# Patient Record
Sex: MALE | Race: WHITE | Employment: UNEMPLOYED | URBAN - METROPOLITAN AREA
[De-identification: names, ages, dates, MRNs, and addresses within clinical notes are randomized per-mention and may not be internally consistent; named-entity substitution may affect disease eponyms.]

---

## 2021-04-05 ENCOUNTER — OFFICE VISIT (OUTPATIENT)
Dept: ORTHOPEDICS | Facility: CLINIC | Age: 46
End: 2021-04-05
Payer: COMMERCIAL

## 2021-04-05 DIAGNOSIS — M54.16 LUMBAR BACK PAIN WITH RADICULOPATHY AFFECTING RIGHT LOWER EXTREMITY: ICD-10-CM

## 2021-04-05 DIAGNOSIS — M54.16 LUMBAR RADICULOPATHY: Primary | ICD-10-CM

## 2021-04-05 PROCEDURE — 99204 OFFICE O/P NEW MOD 45 MIN: CPT | Performed by: FAMILY MEDICINE

## 2021-04-05 NOTE — PROGRESS NOTES
[facility]  4/5/2021    Jose Andrews's chief complaint for this visit includes:  Chief Complaint   Patient presents with     Consult     low back pain, right leg numbness, MRI - CDI      PCP: Con Alfaro      Reason for visit:     What part of your body is injured / painful?  bilateral low back    What caused the injury /pain? No inciting event     How long ago did your injury occur or pain begin? problem is longstanding    What are your most bothersome symptoms? Pain    How would you characterize your symptom?  aching    What makes your symptoms better? Rest    What makes your symptoms worse? Movement    Have you been previously seen for this problem? No    Medical History:    Any recent changes to your medical history? No    Any new medication prescribed since last visit? No    Have you had surgery on this body part before? No    Social History:    Occupation:      Review of Systems:    Do you have fever, chills, weight loss? No    Do you have any vision problems? No    Do you have any chest pain or edema? No    Do you have any shortness of breath or wheezing?  No    Do you have stomach problems? No    Do you have any urinary track issues? No    Do you have any numbness or focal weakness? No    Do you have diabetes? No    Do you have problems with bleeding or clotting? No    Do you have an rashes or other skin lesions? No       CHIEF COMPLAINT:  Consult (low back pain, right leg numbness, MRI - CDI )       HISTORY OF PRESENT ILLNESS  Mr. Andrews is a pleasant 45 year old male who presents to clinic today with back and right leg pain.  Anastacio has had severe low back pain for the past 5 months or so.  No single inciting event that he can recall.  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  He was originally treated at his primary care office and chiropractor's  office, he had attended physical therapy appointments a few times a week for about 5 weeks.  Associated with these were structured, formal physical therapy sessions.  Unfortunately the last treatment he had may have made his back pain somewhat worse.  He points to his low back, bilaterally.  At times he does get numbness and tingling down his right leg, this is a newer issue.  He is in quite severe pain today.        Additional history: as documented    MEDICAL HISTORY  There is no problem list on file for this patient.      No current outpatient medications on file.       No Known Allergies    No family history on file.    Additional medical/Social/Surgical histories reviewed in EPIC and updated as appropriate.        PHYSICAL EXAM  General  - normal appearance, in no obvious distress  HEENT  - conjunctivae not injected, moist mucous membranes  CV  - normal peripheral perfusion  Pulm  - normal respiratory pattern, non-labored  Musculoskeletal - lumbar spine  - stance: slow to rise and sit  - inspection: normal bone and joint alignment, no obvious scoliosis  - palpation: no paravertebral or bony tenderness  - ROM: flexion exacerbates pain, normal extension, sidebending, rotation  - strength: lower extremities 5/5 in all planes  - special tests:  (-) slump test on right  Neuro  - patellar and Achilles DTRs 2+ bilaterally, grossly normal coordination, normal muscle tone  Skin  - no ecchymosis, erythema, warmth, or induration, no obvious rash  Psych  - interactive, appropriate, normal mood and affect                 ASSESSMENT & PLAN  Mr. Andrews is a 45 year old male who presents to clinic today with back and right leg pain.    I reviewed his MR in the room with him, he does have severe disc degeneration at L4-5 with impingement of the L5 nerve roots.    Jose and I had a good discussion regarding non-operative treatment for his back.  This included strengthening of the paraspinal and core muscles, weight control,  and oral analgesics when needed.  We also discussed the role of epidural corticosteroid injections.    I'm referring him for a lumbar injection at L4-5, he can have this done at his earliest convenience.  I'm also referring him to our surgical partners.    It was a pleasure seeing Anastacio today.    Ezra Oneill DO, [facility]  Primary Care Sports Medicine      This note was constructed using Dragon dictation software, please excuse any minor errors in spelling, grammar, or syntax.

## 2021-04-05 NOTE — LETTER
4/5/2021         RE: Jose Andrews  [address]  [place] [address]        Dear Colleague,    Thank you for referring your patient, Jose Andrews, to the [facility] [place]. Please see a copy of my visit note below.          [facility]  4/5/2021    Jose Andrews's chief complaint for this visit includes:  Chief Complaint   Patient presents with     Consult     low back pain, right leg numbness, MRI - CDI      PCP: Con Alfaro      Reason for visit:     What part of your body is injured / painful?  bilateral low back    What caused the injury /pain? No inciting event     How long ago did your injury occur or pain begin? problem is longstanding    What are your most bothersome symptoms? Pain    How would you characterize your symptom?  aching    What makes your symptoms better? Rest    What makes your symptoms worse? Movement    Have you been previously seen for this problem? No    Medical History:    Any recent changes to your medical history? No    Any new medication prescribed since last visit? No    Have you had surgery on this body part before? No    Social History:    Occupation:      Review of Systems:    Do you have fever, chills, weight loss? No    Do you have any vision problems? No    Do you have any chest pain or edema? No    Do you have any shortness of breath or wheezing?  No    Do you have stomach problems? No    Do you have any urinary track issues? No    Do you have any numbness or focal weakness? No    Do you have diabetes? No    Do you have problems with bleeding or clotting? No    Do you have an rashes or other skin lesions? No       CHIEF COMPLAINT:  Consult (low back pain, right leg numbness, MRI - CDI )       HISTORY OF PRESENT ILLNESS  Mr. Andrews is a pleasant 45 year old male who presents to clinic today with back and right leg pain.  Anastacio has had severe low back pain for the past 5 months or so.  No  single inciting event that he can recall.  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  He was originally treated at his primary care office and chiropractor's office, he had attended physical therapy appointments a few times a week for about 5 weeks.  Associated with these were structured, formal physical therapy sessions.  Unfortunately the last treatment he had may have made his back pain somewhat worse.  He points to his low back, bilaterally.  At times he does get numbness and tingling down his right leg, this is a newer issue.  He is in quite severe pain today.        Additional history: as documented    MEDICAL HISTORY  There is no problem list on file for this patient.      No current outpatient medications on file.       No Known Allergies    No family history on file.    Additional medical/Social/Surgical histories reviewed in EPIC and updated as appropriate.        PHYSICAL EXAM  General  - normal appearance, in no obvious distress  HEENT  - conjunctivae not injected, moist mucous membranes  CV  - normal peripheral perfusion  Pulm  - normal respiratory pattern, non-labored  Musculoskeletal - lumbar spine  - stance: slow to rise and sit  - inspection: normal bone and joint alignment, no obvious scoliosis  - palpation: no paravertebral or bony tenderness  - ROM: flexion exacerbates pain, normal extension, sidebending, rotation  - strength: lower extremities 5/5 in all planes  - special tests:  (-) slump test on right  Neuro  - patellar and Achilles DTRs 2+ bilaterally, grossly normal coordination, normal muscle tone  Skin  - no ecchymosis, erythema, warmth, or induration, no obvious rash  Psych  - interactive, appropriate, normal mood and affect                 ASSESSMENT & PLAN  Mr. Andrews is a 45 year old male who presents to clinic today with back and right leg pain.    I reviewed his MR in the  room with him, he does have severe disc degeneration at L4-5 with impingement of the L5 nerve roots.    Jose and I had a good discussion regarding non-operative treatment for his back.  This included strengthening of the paraspinal and core muscles, weight control, and oral analgesics when needed.  We also discussed the role of epidural corticosteroid injections.    I'm referring him for a lumbar injection at L4-5, he can have this done at his earliest convenience.  I'm also referring him to our surgical partners.    It was a pleasure seeing Anastacio today.    Ezra Oneill DO, [facility]  Primary Care Sports Medicine      This note was constructed using Dragon dictation software, please excuse any minor errors in spelling, grammar, or syntax.        Again, thank you for allowing me to participate in the care of your patient.        Sincerely,        Ezra Oneill DO

## 2021-04-05 NOTE — PATIENT INSTRUCTIONS
Thanks for coming today.  Ortho/Sports Medicine Clinic  [address] [place] [address]    To schedule future appointments in Ortho Clinic, you may call [phone].    To schedule ordered imaging by your Provider: Call [facility] at [phone]    [employer] available online at:   [email or web address]    Please call if any further questions or concerns [phone] and ask for the Orthopedic Department. Clinic hours 8 am to 5 pm.    Return to clinic if symptoms worsen.

## 2021-04-13 ENCOUNTER — ANCILLARY PROCEDURE (OUTPATIENT)
Dept: GENERAL RADIOLOGY | Facility: CLINIC | Age: 46
End: 2021-04-13
Attending: ORTHOPAEDIC SURGERY
Payer: COMMERCIAL

## 2021-04-13 ENCOUNTER — OFFICE VISIT (OUTPATIENT)
Dept: ORTHOPEDICS | Facility: CLINIC | Age: 46
End: 2021-04-13
Attending: FAMILY MEDICINE
Payer: COMMERCIAL

## 2021-04-13 VITALS — WEIGHT: 195 LBS | BODY MASS INDEX: 24.25 KG/M2 | HEIGHT: 75 IN

## 2021-04-13 DIAGNOSIS — M54.50 LUMBAR PAIN: Primary | ICD-10-CM

## 2021-04-13 DIAGNOSIS — M54.16 LUMBAR BACK PAIN WITH RADICULOPATHY AFFECTING RIGHT LOWER EXTREMITY: ICD-10-CM

## 2021-04-13 DIAGNOSIS — M48.061 SPINAL STENOSIS OF LUMBAR REGION WITHOUT NEUROGENIC CLAUDICATION: Primary | ICD-10-CM

## 2021-04-13 PROCEDURE — 99204 OFFICE O/P NEW MOD 45 MIN: CPT | Performed by: ORTHOPAEDIC SURGERY

## 2021-04-13 PROCEDURE — 72110 X-RAY EXAM L-2 SPINE 4/>VWS: CPT | Performed by: RADIOLOGY

## 2021-04-13 ASSESSMENT — MIFFLIN-ST. JEOR: SCORE: 1855.14
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
## 2021-04-13 NOTE — NURSING NOTE
Reason For Visit:   Chief Complaint   Patient presents with     Consult     lumbar back pain. patient stated the pain is mainly in his low back and had some numbness in his right foot. has been having symptoms for about 2 months that have been uncontrolable. has an injection scheduled tomorrow.        Primary MD: Con Alfaro  Ref. MD: Nino     ?  No  Occupation  .  Currently working? Yes.  Work status?  Full time.  Date of injury: about 2 months ago   Type of injury: chronic .  Date of surgery: none   Type of surgery: none .  Smoker: No  Request smoking cessation information: No    There were no vitals taken for this visit.         Oswestry (MEE) Questionnaire    No flowsheet data found.         Neck Disability Index (NDI) Questionnaire    No flowsheet data found.                Promis 10 Assessment    No flowsheet data found.             Héctor Koch, ATC
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
## 2021-04-14 ENCOUNTER — TRANSFERRED RECORDS (OUTPATIENT)
Dept: HEALTH INFORMATION MANAGEMENT | Facility: CLINIC | Age: 46
End: 2021-04-14

## 2021-05-05 ENCOUNTER — TELEPHONE (OUTPATIENT)
Dept: ORTHOPEDICS | Facility: CLINIC | Age: 46
End: 2021-05-05

## 2021-05-05 NOTE — TELEPHONE ENCOUNTER
[facility] Call Center    Phone Message    May a detailed message be left on voicemail: yes     Reason for Call: Order(s): Other: Lumbar injection  Reason for requested: lower back pain  Date needed: ASAP  Provider name: Nino    Send order to [facility]    Action Taken: Message routed to:  Adult Clinics: Sports Medicine p [id number]    Travel Screening: Not Applicable

## 2021-05-07 NOTE — TELEPHONE ENCOUNTER
5/7 Called and spoke to patient. He is currently scheduled for telephone visit on Monday.     Laura burden Procedure   Orthopedics, Podiatry, Sports Medicine, ENT/Eye Specialties  [facility]   [phone]

## 2021-05-10 ENCOUNTER — VIRTUAL VISIT (OUTPATIENT)
Dept: ORTHOPEDICS | Facility: CLINIC | Age: 46
End: 2021-05-10
Payer: COMMERCIAL

## 2021-05-10 DIAGNOSIS — M48.061 SPINAL STENOSIS OF LUMBAR REGION WITHOUT NEUROGENIC CLAUDICATION: Primary | ICD-10-CM

## 2021-05-10 DIAGNOSIS — M47.816 LUMBAR SPONDYLOSIS: ICD-10-CM

## 2021-05-10 PROCEDURE — 99213 OFFICE O/P EST LOW 20 MIN: CPT | Mod: TEL | Performed by: FAMILY MEDICINE

## 2021-05-10 NOTE — PROGRESS NOTES
nAastacio is a 45 year old who is being evaluated via a billable telephone visit.        Assessment & Plan     Spinal stenosis of lumbar region without neurogenic claudication  While Anastacio did get some relief with lidocaine, unfortunately he feels no better after receiving this injection.  We did discuss that it may be reasonable to refer him for a facet injection, which he can have done at his earliest convenience.  - XR Lumbar Sacral Facet Inj Bilateral; Future    I am interested in how Anastacio does with regards to his pain 1 to 2 weeks after his injection.  He is going to let me know how he is doing, if this helps he does know that this is something that we could repeat a few times a year, if need be.  If this is not helpful I would refer him to our partners in pain management for consideration of radiofrequency ablation.                       Ezra Oneill [facility]   Anastacio is a 45 year old who is seen today via telephone regarding his back.    HPI     Anastacio had a L4 5 injection done at [facility] since last seeing me.  He has also met with a few spine surgeons.  After some near complete relief with the lidocaine injection, unfortunately he felt no relief with steroid.  This was very disappointing to him.  He continues to have severe back pain that affects him daily.    Review of Systems         Objective           Vitals:  No vitals were obtained today due to virtual visit.    Physical Exam   healthy, alert and no distress  PSYCH: Alert and oriented times 3; coherent speech, normal   rate and volume, able to articulate logical thoughts, able   to abstract reason, no tangential thoughts, no hallucinations   or delusions  His affect is normal  RESP: No cough, no audible wheezing, able to talk in full sentences  Remainder of exam unable to be completed due to telephone visits                Phone call duration: 12 minutes

## 2021-05-10 NOTE — LETTER
5/10/2021         RE: Jose Andrews  [address]  [place] [address]        Dear Colleague,    Thank you for referring your patient, Jose Andrews, to the [facility]. Please see a copy of my visit note below.    Anastacio is a 45 year old who is being evaluated via a billable telephone visit.        Assessment & Plan     Spinal stenosis of lumbar region without neurogenic claudication  While Anastacio did get some relief with lidocaine, unfortunately he feels no better after receiving this injection.  We did discuss that it may be reasonable to refer him for a facet injection, which he can have done at his earliest convenience.  - XR Lumbar Sacral Facet Inj Bilateral; Future    I am interested in how Anastacio does with regards to his pain 1 to 2 weeks after his injection.  He is going to let me know how he is doing, if this helps he does know that this is something that we could repeat a few times a year, if need be.  If this is not helpful I would refer him to our partners in pain management for consideration of radiofrequency ablation.                       Ezra Oneill, DO  [facility]    Subjective   Anastacio is a 45 year old who is seen today via telephone regarding his back.    HPI     Anastacio had a L4 5 injection done at [facility] since last seeing me.  He has also met with a few spine surgeons.  After some near complete relief with the lidocaine injection, unfortunately he felt no relief with steroid.  This was very disappointing to him.  He continues to have severe back pain that affects him daily.    Review of Systems         Objective           Vitals:  No vitals were obtained today due to virtual visit.    Physical Exam   healthy, alert and no distress  PSYCH: Alert and oriented times 3; coherent speech, normal   rate and volume, able to articulate logical thoughts, able   to abstract reason, no tangential thoughts, no  hallucinations   or delusions  His affect is normal  RESP: No cough, no audible wheezing, able to talk in full sentences  Remainder of exam unable to be completed due to telephone visits                Phone call duration: 12 minutes        Again, thank you for allowing me to participate in the care of your patient.        Sincerely,        Ezra Oneill, DO

## 2021-05-10 NOTE — PATIENT INSTRUCTIONS
Thanks for coming today.  Ortho/Sports Medicine Clinic  [address] [place] [address]    To schedule future appointments in Ortho Clinic, you may call [phone].    To schedule ordered imaging by your Provider: Call [facility] at [phone]    [facility] available online at:   [email or web address]    Please call if any further questions or concerns [phone] and ask for the Orthopedic Department. Clinic hours 8 am to 5 pm.    Return to clinic if symptoms worsen.

## 2021-05-10 NOTE — LETTER
5/10/2021         RE: Jose Andrews  [address]  [place] [address]        Dear Colleague,    Thank you for referring your patient, oJse Andrews, to the [facility] SP[facility]TS [facility]. Please see a copy of my visit note below.    Anastacio is a 45 year old who is being evaluated via a billable telephone visit.        Assessment & Plan     Spinal stenosis of lumbar region without neurogenic claudication  While Anastacio did get some relief with lidocaine, unfortunately he feels no better after receiving this injection.  We did discuss that it may be reasonable to refer him for a facet injection, which he can have done at his earliest convenience.  - XR Lumbar Sacral Facet Inj Bilateral; Future    I am interested in how Anastacio does with regards to his pain 1 to 2 weeks after his injection.  He is going to let me know how he is doing, if this helps he does know that this is something that we could repeat a few times a year, if need be.  If this is not helpful I would refer him to our partners in pain management for consideration of radiofrequency ablation.                       Ezra Oneill, DO  [facility]    Subjective   Anastacio is a 45 year old who is seen today via telephone regarding his back.    HPI     Anastacio had a L4 5 injection done at [facility] since last seeing me.  He has also met with a few spine surgeons.  After some near complete relief with the lidocaine injection, unfortunately he felt no relief with steroid.  This was very disappointing to him.  He continues to have severe back pain that affects him daily.    Review of Systems         Objective           Vitals:  No vitals were obtained today due to virtual visit.    Physical Exam   healthy, alert and no distress  PSYCH: Alert and oriented times 3; coherent speech, normal   rate and volume, able to articulate logical thoughts, able   to abstract reason, no tangential thoughts, no  hallucinations   or delusions  His affect is normal  RESP: No cough, no audible wheezing, able to talk in full sentences  Remainder of exam unable to be completed due to telephone visits                Phone call duration: 12 minutes        Again, thank you for allowing me to participate in the care of your patient.        Sincerely,        Ezra Oneill, DO